# Patient Record
(demographics unavailable — no encounter records)

---

## 2025-04-11 NOTE — REASON FOR VISIT
[Initial Evaluation] : an initial evaluation [Family Member] : family member [FreeTextEntry1] : MARSHA

## 2025-04-11 NOTE — REVIEW OF SYSTEMS
[Chest Pain] : no chest pain [Palpitations] : no palpitations [Dysuria] : no dysuria [Arthralgias] : no arthralgias [Itching] : no itching [Confused] : no confusion [Negative] : Heme/Lymph

## 2025-04-11 NOTE — HISTORY OF PRESENT ILLNESS
[FreeTextEntry1] : Patient was offered free  services in her preferred language but declined services.   51y [place]raini Female w/ Hx of HLD (on statin at home), who [place] at [place] end of December 2024, and was sent to [facility] ER on 3/28/25 w/ severe hepatocellular liver enzyme elevation noted in outpatient labs, for at least 3 weeks, w/ , , , w/ normal bilirubin INR and low normal albumin. She noted to have highly pos ds DNA (197), ANJUM (1:160), elevated IgG (1875) and elevated ferritin (563). Rest of liver w/u: Hep A immune, HCV ab/RNA neg, Hep B neg, not immune, not exposed. EBV past infection, CMV IgM neg, HSV1 IgG pos, HSV 2 IgG neg, VZV IgG pos, ceruloplasmin 24. She underwent percutaneous liver biopsy and was started on prednisone 60 mg after biopsy given the high suspicion of AIH. She was discharged after 3 days 60 mg prednisone, on 40 mg prednisone. Her liver enzymes started to improve.   She is here for initial post hospital follow up and to establish care, accompanied by daughter.  She reported compliance w/ prednisone and pantoprazole, and she is aware to avoid rechallenge w/ atorvastatin.  She reported feeling well, denied any signs / symptoms of infection.  She was noted to have pos Quantiferon and upon questioning she reported that was treated for bone TB about 15 years ago in [facility], but no documentation, no records.

## 2025-04-11 NOTE — ASSESSMENT
[FreeTextEntry1] : 51y Greenlandic Female w/ Hx of HLD, who [place] at [place] end of December 2024, and was sent to [facility] ER on 3/28/25 w/ severe hepatocellular liver enzyme elevation noted in outpatient labs, for at least 3 weeks, w/ , , , w/ normal bilirubin INR and low normal albumin, found to have highly pos ds DNA (197) ,ANJUM (1:160), elevated IgG (1875). 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 Her liver enzymes improved, especially after started on prednisone. Pos Quantiferon complicates, but reported remote treatment 15 years ago for bone TB.   # Severe acute / subacute hepatocellular liver injury w/o failure - likely de suze AIH vs AI-DILI - C/w prednisone, taper to 30 mg from tomorrow - C/w PPI - Will get repeat LFTs now and in 1 week - C/w vit D - Will get TPMT - Will obtain CXR and refer to ID ASAP - Will refer to rheumatology - Continue holding atorvastatin fenofibrate, and avoid rechallenge with them  - Discussed when to seek immediate medical attention  RTC 2 weeks

## 2025-04-11 NOTE — ASSESSMENT
[FreeTextEntry1] : 51y [place] Female w/ Hx of HLD, who [place] at [place] end of December 2024, and was sent to [facility] ER on 3/28/25 w/ severe hepatocellular liver enzyme elevation noted in outpatient labs, for at least 3 weeks, w/ , , , w/ normal bilirubin INR and low normal albumin, found to have highly pos ds DNA (197) ,ANJUM (1:160), elevated IgG (1875). 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 Her liver enzymes improved, especially after started on prednisone. Pos Quantiferon complicates, but reported remote treatment 15 years ago for bone TB.   # Severe acute / subacute hepatocellular liver injury w/o failure - likely de suze AIH vs AI-DILI - C/w prednisone, taper to 30 mg from tomorrow - C/w PPI - Will get repeat LFTs now and in 1 week - C/w vit D - Will get TPMT - Will obtain CXR and refer to ID ASAP - Will refer to rheumatology - Continue holding atorvastatin fenofibrate, and avoid rechallenge with them  - Discussed when to seek immediate medical attention  RTC 2 weeks

## 2025-04-11 NOTE — HISTORY OF PRESENT ILLNESS
[FreeTextEntry1] : Patient was offered free  services in her preferred language but declined services.   51y [place] Female w/ Hx of HLD (on statin at home), who [place] at [place] end of December 2024, and was sent to [facility] ER on 3/28/25 w/ severe hepatocellular liver enzyme elevation noted in outpatient labs, for at least 3 weeks, w/ , , , w/ normal bilirubin INR and low normal albumin. She noted to have highly pos ds DNA (197), ANJUM (1:160), elevated IgG (1875) and elevated ferritin (563). Rest of liver w/u: Hep A immune, HCV ab/RNA neg, Hep B neg, not immune, not exposed. EBV past infection, CMV IgM neg, HSV1 IgG pos, HSV 2 IgG neg, VZV IgG pos, ceruloplasmin 24. She underwent percutaneous liver biopsy and was started on prednisone 60 mg after biopsy given the high suspicion of AIH. She was discharged after 3 days 60 mg prednisone, on 40 mg prednisone. Her liver enzymes started to improve.   She is here for initial post hospital follow up and to establish care, accompanied by daughter.  She reported compliance w/ prednisone and pantoprazole, and she is aware to avoid rechallenge w/ atorvastatin.  She reported feeling well, denied any signs / symptoms of infection.  She was noted to have pos Quantiferon and upon questioning she reported that was treated for bone TB about 15 years ago in [facility], but no documentation, no records.

## 2025-04-11 NOTE — PHYSICAL EXAM
[Scleral Icterus] : No Scleral Icterus [Spider Angioma] : No spider angioma(s) were observed [Abdominal  Ascites] : no ascites [Non-Tender] : non-tender [Asterixis] : no asterixis observed [Jaundice] : No jaundice [Palmar Erythema] : no Palmar Erythema [General Appearance - Alert] : alert [General Appearance - In No Acute Distress] : in no acute distress [General Appearance - Well Nourished] : well nourished [General Appearance - Well Developed] : well developed [Sclera] : the sclera and conjunctiva were normal [Oropharynx] : the oropharynx was normal [Auscultation Breath Sounds / Voice Sounds] : lungs were clear to auscultation bilaterally [Heart Rate And Rhythm] : heart rate was normal and rhythm regular [Heart Sounds] : normal S1 and S2 [Heart Sounds Gallop] : no gallops [Murmurs] : no murmurs [Heart Sounds Pericardial Friction Rub] : no pericardial rub [Edema] : there was no peripheral edema [Bowel Sounds] : normal bowel sounds [Abdomen Soft] : soft [Abdomen Tenderness] : non-tender [] : no hepato-splenomegaly [Abdomen Mass (___ Cm)] : no abdominal mass palpated [Cervical Lymph Nodes Enlarged Posterior Bilaterally] : posterior cervical [Cervical Lymph Nodes Enlarged Anterior Bilaterally] : anterior cervical [Supraclavicular Lymph Nodes Enlarged Bilaterally] : supraclavicular [Axillary Lymph Nodes Enlarged Bilaterally] : axillary [Femoral Lymph Nodes Enlarged Bilaterally] : femoral [Inguinal Lymph Nodes Enlarged Bilaterally] : inguinal [No CVA Tenderness] : no ~M costovertebral angle tenderness [No Spinal Tenderness] : no spinal tenderness [Abnormal Walk] : normal gait [Skin Color & Pigmentation] : normal skin color and pigmentation [FreeTextEntry1] : Grossly intact

## 2025-05-02 NOTE — HISTORY OF PRESENT ILLNESS
[FreeTextEntry1] : Patient was offered free  services in her preferred language but declined services.   51y Bangladeshi Female w/ Hx of HLD (on statin at home), who [place] at [place] end of December 2024, and was sent to [facility] ER on 3/28/25 w/ severe hepatocellular liver enzyme elevation noted in outpatient labs, for at least 3 weeks, w/ , , , w/ normal bilirubin INR and low normal albumin. She noted to have highly pos ds DNA (197), ANJUM (1:160), elevated IgG (1875) and elevated ferritin (563). Rest of liver w/u: Hep A immune, HCV ab/RNA neg, Hep B neg, not immune, not exposed. EBV past infection, CMV IgM neg, HSV1 IgG pos, HSV 2 IgG neg, VZV IgG pos, ceruloplasmin 24. She underwent percutaneous liver biopsy and was started on prednisone 60 mg after biopsy given the high suspicion of AIH. She was discharged after 3 days 60 mg prednisone, on 40 mg prednisone. Her liver enzymes started to improve.   She was seen for initial post hospital follow up and to establish care on 4/11/25, accompanied by daughter.  She reported compliance w/ prednisone and pantoprazole, and she is aware to avoid rechallenge w/ atorvastatin.  She reported feeling well, denied any signs / symptoms of infection.  She was noted to have pos Quantiferon and upon questioning she reported that was treated for bone TB about 15 years ago in [facility], but no documentation, no records.   She is here for follow up. Her liver enzymes normalized by 4/25/25 despite that she missed 3 days of prednisone.  She resumed in 4/25/25. She reported feeling well, no cough or SOB, no joint apin, no signs or symp[toms of infection, no abdominal complaints.  Noted borderline TPMT level, patient is not high risk for steroid side effects and given the borderline TPMT, will try to keep on the lowest dose of steroid. She has CXR scheduled, but has not scheduled ID or rheum appt. yet.

## 2025-05-02 NOTE — ASSESSMENT
[FreeTextEntry1] : 51y Sudanese Female w/ Hx of HLD, who [place] at [place] end of [place] 2024, and was sent to [facility] ER on 3/28/25 w/ severe hepatocellular liver enzyme elevation noted in outpatient labs, for at least 3 weeks, w/ , , , w/ normal bilirubin INR and low normal albumin, found to have highly pos ds DNA (197) ,ANJUM (1:160), elevated IgG (1875). 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 Her liver enzymes improved, especially after started on prednisone. Pos Quantiferon complicates, but reported remote treatment 15 years ago for bone TB.   # Severe acute / subacute hepatocellular liver injury w/o failure - likely de suze AIH vs AI-DILI - C/w prednisone, taper to 15 mg from today for a 1 week, followed by 10  mg for 1 week and 5 mg onward - C/w PPI - Will get repeat LFTs now and weekly for next 3 weeks - C/w vit D - TPMT 14.8  - Still to obtain CXR and to schedule ID ASAP - Advised again to schedule rheumatology appt.  - Continue holding atorvastatin fenofibrate, and avoid rechallenge with them. Will repeat lipid panel now and decide if she needs alternative lipid lowering medication.  - Discussed when to seek immediate medical attention  RTC 3 weeks

## 2025-05-31 NOTE — REVIEW OF SYSTEMS
[Negative] : Heme/Lymph [Chest Pain] : no chest pain [Palpitations] : no palpitations [Dysuria] : no dysuria [Arthralgias] : no arthralgias [Itching] : no itching [Confused] : no confusion

## 2025-05-31 NOTE — HISTORY OF PRESENT ILLNESS
[FreeTextEntry1] : Patient was offered free  services in her preferred language but declined services.   51y [place] Female w/ Hx of HLD (on statin at home), who [place] at [place] end of [place] 2024, and was sent to [facility] ER on 3/28/25 w/ severe hepatocellular liver enzyme elevation noted in outpatient labs, for at least 3 weeks, w/ , , , w/ normal bilirubin INR and low normal albumin. She noted to have highly pos ds DNA (197), ANJUM (1:160), elevated IgG (1875) and elevated ferritin (563). Rest of liver w/u: Hep A immune, HCV ab/RNA neg, Hep B neg, not immune, not exposed. EBV past infection, CMV IgM neg, HSV1 IgG pos, HSV 2 IgG neg, VZV IgG pos, ceruloplasmin 24. She underwent percutaneous liver biopsy and was started on prednisone 60 mg after biopsy given the high suspicion of AIH. She was discharged after 3 days 60 mg prednisone, on 40 mg prednisone. Her liver enzymes started to improve.   She was seen for initial post hospital follow up and to establish care on 4/11/25, accompanied by daughter.  She reported compliance w/ prednisone and pantoprazole, and she is aware to avoid rechallenge w/ atorvastatin.  She reported feeling well, denied any signs / symptoms of infection.  She was noted to have pos Quantiferon and upon questioning she reported that was treated for bone TB about 15 years ago in [facility], but no documentation, no records.   5/2/25 follow up. Her liver enzymes normalized by 4/25/25 despite that she missed 3 days of prednisone.  She resumed in 4/25/25. She reported feeling well, no cough or SOB, no joint pain, no signs or symptoms of infection, no abdominal complaints.  Noted borderline TPMT level, patient is not high risk for steroid side effects and given the borderline TPMT, will try to keep on the lowest dose of steroid. She has CXR scheduled, but has not scheduled ID or rheum appt. yet.   She is here for follow up. Her liver enzymes remained normal on 5 mg prednisone. Reported feeling well. Noted to have elevated cholesterol and while ASCVD score < 5%, referred to cariology for cholesterol mgmt.

## 2025-05-31 NOTE — PHYSICAL EXAM
[Non-Tender] : non-tender [General Appearance - Alert] : alert [General Appearance - In No Acute Distress] : in no acute distress [General Appearance - Well Nourished] : well nourished [General Appearance - Well Developed] : well developed [Sclera] : the sclera and conjunctiva were normal [Oropharynx] : the oropharynx was normal [Auscultation Breath Sounds / Voice Sounds] : lungs were clear to auscultation bilaterally [Heart Rate And Rhythm] : heart rate was normal and rhythm regular [Heart Sounds] : normal S1 and S2 [Heart Sounds Gallop] : no gallops [Murmurs] : no murmurs [Heart Sounds Pericardial Friction Rub] : no pericardial rub [Edema] : there was no peripheral edema [Bowel Sounds] : normal bowel sounds [Abdomen Soft] : soft [Abdomen Tenderness] : non-tender [] : no hepato-splenomegaly [Abdomen Mass (___ Cm)] : no abdominal mass palpated [Cervical Lymph Nodes Enlarged Posterior Bilaterally] : posterior cervical [Cervical Lymph Nodes Enlarged Anterior Bilaterally] : anterior cervical [Supraclavicular Lymph Nodes Enlarged Bilaterally] : supraclavicular [Axillary Lymph Nodes Enlarged Bilaterally] : axillary [Femoral Lymph Nodes Enlarged Bilaterally] : femoral [Inguinal Lymph Nodes Enlarged Bilaterally] : inguinal [No CVA Tenderness] : no ~M costovertebral angle tenderness [No Spinal Tenderness] : no spinal tenderness [Abnormal Walk] : normal gait [Skin Color & Pigmentation] : normal skin color and pigmentation [Scleral Icterus] : No Scleral Icterus [Spider Angioma] : No spider angioma(s) were observed [Abdominal  Ascites] : no ascites [Asterixis] : no asterixis observed [Jaundice] : No jaundice [Palmar Erythema] : no Palmar Erythema [FreeTextEntry1] : Grossly intact

## 2025-05-31 NOTE — HISTORY OF PRESENT ILLNESS
[FreeTextEntry1] : Patient was offered free  services in her preferred language but declined services.   51y Eritrean Female w/ Hx of HLD (on statin at home), who [place] at [place] end of [place] 2024, and was sent to [facility] ER on 3/28/25 w/ severe hepatocellular liver enzyme elevation noted in outpatient labs, for at least 3 weeks, w/ , , , w/ normal bilirubin INR and low normal albumin. She noted to have highly pos ds DNA (197), ANJUM (1:160), elevated IgG (1875) and elevated ferritin (563). Rest of liver w/u: Hep A immune, HCV ab/RNA neg, Hep B neg, not immune, not exposed. EBV past infection, CMV IgM neg, HSV1 IgG pos, HSV 2 IgG neg, VZV IgG pos, ceruloplasmin 24. She underwent percutaneous liver biopsy and was started on prednisone 60 mg after biopsy given the high suspicion of AIH. She was discharged after 3 days 60 mg prednisone, on 40 mg prednisone. Her liver enzymes started to improve.   She was seen for initial post hospital follow up and to establish care on 4/11/25, accompanied by daughter.  She reported compliance w/ prednisone and pantoprazole, and she is aware to avoid rechallenge w/ atorvastatin.  She reported feeling well, denied any signs / symptoms of infection.  She was noted to have pos Quantiferon and upon questioning she reported that was treated for bone TB about 15 years ago in [facility], but no documentation, no records.   5/2/25 follow up. Her liver enzymes normalized by 4/25/25 despite that she missed 3 days of prednisone.  She resumed in 4/25/25. She reported feeling well, no cough or SOB, no joint pain, no signs or symptoms of infection, no abdominal complaints.  Noted borderline TPMT level, patient is not high risk for steroid side effects and given the borderline TPMT, will try to keep on the lowest dose of steroid. She has CXR scheduled, but has not scheduled ID or rheum appt. yet.   She is here for follow up. Her liver enzymes remained normal on 5 mg prednisone. Reported feeling well. Noted to have elevated cholesterol and while ASCVD score < 5%, referred to cariology for cholesterol mgmt.

## 2025-05-31 NOTE — ASSESSMENT
[FreeTextEntry1] : 51y [place] Female w/ Hx of HLD, who [place] at [place] end of [place] 2024, and was sent to [facility] ER on 3/28/25 w/ severe hepatocellular liver enzyme elevation noted in outpatient labs, for at least 3 weeks, w/ , , , w/ normal bilirubin INR and low normal albumin, found to have highly pos ds DNA (197) ,ANJUM (1:160), elevated IgG (1875). 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 Her liver enzymes improved, especially after started on prednisone. Pos Quantiferon complicates, but reported remote treatment 15 years ago for bone TB.   # Severe acute / subacute hepatocellular liver injury w/o failure - likely de suze AIH vs AI-DILI - C/w prednisone 5 mg for now - C/w PPI - Will get repeat LFTs in 1 month - C/w vit D - TPMT 14.8  - Still to obtain CXR and to schedule ID ASAP - Advised again to schedule rheumatology appt.  - Continue holding atorvastatin fenofibrate, and avoid rechallenge with them. Referred to cardiology for cholesterol mgmt.  - Discussed when to seek immediate medical attention  RTC 1 month

## 2025-05-31 NOTE — ASSESSMENT
[FreeTextEntry1] : 51y U[place] Female w/ Hx of HLD, who arrived [place] at [place] end of [place] 2024, and was sent to [facility] on 3/28/25 w/ severe hepatocellular liver enzyme elevation noted in outpatient labs, for at least 3 weeks, w/ , , , w/ normal bilirubin INR and low normal albumin, found to have highly pos ds DNA (197) ,ANJUM (1:160), elevated IgG (1875). 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 Her liver enzymes improved, especially after started on prednisone. Pos Quantiferon complicates, but reported remote treatment 15 years ago for bone TB.   # Severe acute / subacute hepatocellular liver injury w/o failure - likely de suze AIH vs AI-DILI - C/w prednisone 5 mg for now - C/w PPI - Will get repeat LFTs in 1 month - C/w vit D - TPMT 14.8  - Still to obtain CXR and to schedule ID ASAP - Advised again to schedule rheumatology appt.  - Continue holding atorvastatin fenofibrate, and avoid rechallenge with them. Referred to cardiology for cholesterol mgmt.  - Discussed when to seek immediate medical attention  RTC 1 month